# Patient Record
Sex: MALE | NOT HISPANIC OR LATINO | URBAN - METROPOLITAN AREA
[De-identification: names, ages, dates, MRNs, and addresses within clinical notes are randomized per-mention and may not be internally consistent; named-entity substitution may affect disease eponyms.]

---

## 2018-02-09 ENCOUNTER — APPOINTMENT (RX ONLY)
Dept: URBAN - METROPOLITAN AREA CLINIC 369 | Facility: CLINIC | Age: 38
Setting detail: DERMATOLOGY
End: 2018-02-09

## 2018-02-09 DIAGNOSIS — L30.9 DERMATITIS, UNSPECIFIED: ICD-10-CM

## 2018-02-09 PROCEDURE — 99202 OFFICE O/P NEW SF 15 MIN: CPT

## 2018-02-09 PROCEDURE — ? COUNSELING

## 2018-02-09 PROCEDURE — ? TREATMENT REGIMEN

## 2018-02-09 ASSESSMENT — LOCATION ZONE DERM: LOCATION ZONE: FACE

## 2018-02-09 ASSESSMENT — LOCATION SIMPLE DESCRIPTION DERM: LOCATION SIMPLE: LEFT CHEEK

## 2018-02-09 ASSESSMENT — LOCATION DETAILED DESCRIPTION DERM: LOCATION DETAILED: LEFT CENTRAL MALAR CHEEK

## 2018-02-09 NOTE — PROCEDURE: TREATMENT REGIMEN
Detail Level: Zone
Plan: will treat as eczematous dermatitis with sample of cloderm cream to be applied BID x 2-3 weeks\\n\\nIf no improvement in that time period, f/u for re-eval and possible LN2 or bx

## 2022-06-27 ENCOUNTER — NEW PATIENT (OUTPATIENT)
Age: 42
End: 2022-06-27

## 2022-06-27 DIAGNOSIS — H04.123: ICD-10-CM

## 2022-06-27 DIAGNOSIS — H52.13: ICD-10-CM

## 2022-06-27 DIAGNOSIS — Z83.511: ICD-10-CM

## 2022-06-27 DIAGNOSIS — Z83.518: ICD-10-CM

## 2022-06-27 DIAGNOSIS — H52.222: ICD-10-CM

## 2022-06-27 PROCEDURE — 92015 DETERMINE REFRACTIVE STATE: CPT

## 2022-06-27 PROCEDURE — 99203 OFFICE O/P NEW LOW 30 MIN: CPT

## 2022-06-27 ASSESSMENT — KERATOMETRY
OS_AXISANGLE2_DEGREES: 128
OD_K2POWER_DIOPTERS: 41.50
OS_K1POWER_DIOPTERS: 39.75
OS_K2POWER_DIOPTERS: 41.75
OS_AXISANGLE_DEGREES: 38
OD_K1POWER_DIOPTERS: 41.00
OD_AXISANGLE2_DEGREES: 82
OD_AXISANGLE_DEGREES: 172

## 2022-06-27 ASSESSMENT — TONOMETRY
OS_IOP_MMHG: 14
OD_IOP_MMHG: 13

## 2022-06-27 ASSESSMENT — VISUAL ACUITY
OS_CC: 20/20
OD_CC: 20/20

## 2024-08-28 ENCOUNTER — PROBLEM (OUTPATIENT)
Dept: URBAN - METROPOLITAN AREA CLINIC 45 | Facility: CLINIC | Age: 44
End: 2024-08-28

## 2024-08-28 DIAGNOSIS — H04.123: ICD-10-CM

## 2024-08-28 DIAGNOSIS — B88.0: ICD-10-CM

## 2024-08-28 DIAGNOSIS — H01.024: ICD-10-CM

## 2024-08-28 DIAGNOSIS — H00.14: ICD-10-CM

## 2024-08-28 DIAGNOSIS — H01.021: ICD-10-CM

## 2024-08-28 PROCEDURE — 99213 OFFICE O/P EST LOW 20 MIN: CPT

## 2024-08-28 ASSESSMENT — VISUAL ACUITY
OD_CC: 20/20
OS_CC: 20/20

## (undated) RX ORDER — DOXYCYCLINE HYCLATE 150 MG/1
1 TABLET, DELAYED RELEASE ORAL TWICE A DAY
Start: 2024-08-28 | End: 2024-09-04